# Patient Record
Sex: MALE | Race: WHITE | Employment: OTHER | URBAN - METROPOLITAN AREA
[De-identification: names, ages, dates, MRNs, and addresses within clinical notes are randomized per-mention and may not be internally consistent; named-entity substitution may affect disease eponyms.]

---

## 2019-07-19 ENCOUNTER — HOSPITAL ENCOUNTER (OUTPATIENT)
Dept: SURGERY | Age: 79
Discharge: HOME | End: 2019-07-19

## 2019-07-19 VITALS
TEMPERATURE: 98 F | RESPIRATION RATE: 16 BRPM | BODY MASS INDEX: 33.92 KG/M2 | SYSTOLIC BLOOD PRESSURE: 142 MMHG | HEART RATE: 54 BPM | WEIGHT: 250.44 LBS | DIASTOLIC BLOOD PRESSURE: 84 MMHG | OXYGEN SATURATION: 95 % | HEIGHT: 72 IN

## 2019-07-19 NOTE — PERIOP NOTES
Patient verified name and . Order for consent was not found in EHR and  patient verifies procedure. Type lB surgery, walk in assessment complete. Orders were niot received. Labs per surgeon: none at present time  Labs per anesthesia protocol: none per protocol  EKG is not required per protocol. Patient denies hx of CAD,chest pain or shortness of breath on exertion      Patient answered medical/surgical history questions at their best of ability. All prior to admission medications documented in [facility]. Patient instructed to take the following medications the day of surgery according to anesthesia guidelines with a small sip of water: none . Hold all vitamins, pred ared, aleve 7 days prior to surgery and NSAIDS 5 days prior to surgery. Prescription meds to hold:none    Patient instructed on the following:  Arrive at [facility], time of arrival to be called the day before by 1700  NPO after midnight including gum, mints, and ice chips  Responsible adult must drive patient to the hospital, stay during surgery, and patient will need supervision 24 hours after anesthesia  Use antibacterial soap and hibiclens in shower the night before surgery and on the morning of surgery  All piercings must be removed prior to arrival.    Leave all valuables (money and jewelry) at home but bring insurance card and ID on       DOS. Do not wear make-up, nail polish, lotions, cologne, perfumes, powders, or oil on skin. Patient teach back successful and patient demonstrates knowledge of instruction.

## 2019-08-18 NOTE — H&P
Darek Stevenson   History and physical     Subjective  Problem List:.     1. Right knee pain./DJD. 2. Right shoulder pain/DJD. This patient presents today for evaluation of right shoulder pain. The patient comes in today for evaluation, history and physical, and surgical consent signing. The surgical procedure was reviewed in detail with the patient. The risks, including but not limited to anesthesia, infection, deep vein thrombosis, pulmonary embolus, injury to vessels, tendons, and nerves, paralysis, stroke, heart attack, loss of limb, and death were discussed. The patient understands the post operative course and all questions were answered. No guarantees are made and all alternatives are given. The patient wishes to proceed with the surgery. Appropriate literature and relevant material was reviewed with the patient. Surgical procedure: right shoulder arthroscopy with ketan procedure with possible rotator cuff repair. Allergies:  NKDA    Family health history: heart disease-father. Major events: removal of the lacerated fragment of the liver, right knee arthroscopy. Ongoing medical problems: arthritis, obesity. Social history: Patient is a former tobacco user and EtOH use occasionally. The patient is . Radha Vazquez REVIEW OF SYSTEMS:.     General: Denies weight change, generally healthy,  change in strength or exercise tolerance. .     Head: Denies headaches,  vertigo,  injury. .     Eyes: Denies changes in vision. Ears:  Denies change in hearing. Nose: Denies epistaxis,  obstruction,  discharge. .     Mouth: Denies dental difficulties. Neck: Denies pain or stiffness. Chest: Denies cough or SOB. Heart: Denies chest pains,  palpitations. Abdomen: Denies change in appetite  constipation or diarrhea. : Denies urinary urgency,  dysuria, change in nature of urine. .     Neurologic: Denies weakness, seizures or paralysis.      Psychiatric: Denies depressive symptoms, changes in sleep habits,  changes in thought content. .     Objective  Vital signs: Height 72 inches; Weight 248 lbs; /74 mmHg; Temp 97.3 F; Pulse 64 bpm; Oxygen Saturation 96 %. .    Patient is a [de-identified]year old male who appears his given age and is in no apparent distress. Oriented to person, place, and time. Mood and affect are appropriate for age and situation. Assessment of respiratory effort reveals even and nonlabored respirations. GEN:NAD    Lungs clear to auscultation bilaterally. Heart rate regular without murmur heard to auscultation. The patient exhibits mild antalgic reciprocal gait, and is able to get onto and off of the examination table. Right Shoulder MRI (performed on 8/24/19 @ [facility]) Impression:. Full-thickness supraspinatus and infraspinatus tendon tears. Partial-thickness articular-sided subscapularis tendon tear. AC joint arthropathy. Glenohumeral joint effusion with mild DJD. Avulsion of the long head biceps tendon from the superior glenoid tubercle. Degrees of atrophy of the supraspinatus, infraspinatus, and subscapularis muscle bellies. Mild edema within the infraspinatus muscle. .      Right Shoulder X-Rays (3 views - CPT 10708) taken 5/22/19 revealed; Type 1 acromion, Moderate DJD of the [facility] joint, good joint space in the Glenohumeral joint and subacromial space. . No acute osseous abnormality. Right Shoulder Examination:. Inspection reveals benign arthroscopic portals scars with no s/s infection. Palpation reveals tenderness to  anterolateral acromion. Shoulder forward flexion (active): 160 with pain. Shoulder abduction (active): 160 degrees, with pain. Muscle strength 5/5. Muscle tone is normal.     Stability: No objective shoulder instability. Vascular: Peripheral pulses normal 2/2 upper extremities. Neurologic: Sensation is intact and symmetrical in all dermatomes upper extremities.      Upper extremity deep tendon reflexes are normal..     Coordination is good. Assessment    DIAGNOSIS:        Pain in right shoulder [ICD-10: M25.511], [ICD-9: 719.41], [SNOMED: 75268111975752969]        Primary osteoarthritis, right shoulder [ICD-10: M19.011], [ICD-9: 715.11], [SNOMED: 997652387]        Injury of tendon of long head of biceps of right arm, subsequent encounter [ICD-10: S46.101D], [ICD-9: V58.89], [SNOMED: 297461293]        Tear of supraspinatus tendon [ICD-10: M75.111], [ICD-9: 726.10], [SNOMED: 824830722]        Effusion, right shoulder [ICD-10: M25.411], [ICD-9: 719.01], [SNOMED: 87959013]        Pre-op evaluation [ICD-10: S99.787], [SNOMED: 813116740]  Plan  We discussed the pathophysiology of the diagnosis and options for treatment. We reviewed the conservative treatment options in detail. We discussed the surgical option(s) (right shoulder arthroscopy with ketan procedure with possible rotator cuff repair). We have talked about the complications of surgery, including the possibility of damage to nerves, arteries, vessels and tendons, bleeding, infection, the possibility of sustaining medical problems, even death. We have talked about the possibility that the condition may not improve after surgery  or that it could actually be worse. The patient seems to understand and accept these possible complications. The patient understands and wishes to proceed with surgery at this time. Informed surgical consent obtained. Surgery will be performed at [facility] on 7-23-19. All questions answered at this time. The patient knows to contact the office with any questions or concerns. VERIFICATION OF ANCILLARY DOCUMENTATION: The portions of the chart completed by ancillary personnel were reviewed by the physician. .    RTC: post-op .  TM  L4      MEDICATIONS:        Mobic 7.5 MG Oral Tablet one every 12 hours        Acetaminophen -25 MG Oral Tablet 1 tablet orally daily at bedtime as needed        Aleve -25 MG Oral Tablet as needed        MCT Oil Oral Oil 600 mg daily

## 2019-08-19 ENCOUNTER — ANESTHESIA EVENT (OUTPATIENT)
Dept: SURGERY | Age: 79
End: 2019-08-19
Payer: MEDICARE

## 2019-08-20 ENCOUNTER — ANESTHESIA (OUTPATIENT)
Dept: SURGERY | Age: 79
End: 2019-08-20
Payer: MEDICARE

## 2019-08-20 ENCOUNTER — HOSPITAL ENCOUNTER (OUTPATIENT)
Age: 79
Discharge: HOME | End: 2019-08-20
Attending: ORTHOPAEDIC SURGERY | Admitting: ORTHOPAEDIC SURGERY
Payer: MEDICARE

## 2019-08-20 VITALS
BODY MASS INDEX: 33.91 KG/M2 | DIASTOLIC BLOOD PRESSURE: 74 MMHG | OXYGEN SATURATION: 94 % | SYSTOLIC BLOOD PRESSURE: 154 MMHG | RESPIRATION RATE: 15 BRPM | HEART RATE: 57 BPM | WEIGHT: 250 LBS | TEMPERATURE: 97.7 F

## 2019-08-20 PROBLEM — M75.121 COMPLETE ROTATOR CUFF TEAR OR RUPTURE OF RIGHT SHOULDER, NOT SPECIFIED AS TRAUMATIC: Status: ACTIVE | Noted: 2019-08-20

## 2019-08-20 PROCEDURE — 74011250636 HC RX REV CODE- 250/636: Performed by: ORTHOPAEDIC SURGERY

## 2019-08-20 PROCEDURE — 77030039266 HC ADH SKN EXOFIN S2SG -A: Performed by: ORTHOPAEDIC SURGERY

## 2019-08-20 PROCEDURE — 77030006788 HC BLD SAW OSC STRY -B: Performed by: ORTHOPAEDIC SURGERY

## 2019-08-20 PROCEDURE — 77030027384 HC PRB ARTHSCP SERFAS STRY -C: Performed by: ORTHOPAEDIC SURGERY

## 2019-08-20 PROCEDURE — 76010010054 HC POST OP PAIN BLOCK: Performed by: ORTHOPAEDIC SURGERY

## 2019-08-20 PROCEDURE — 77030002916 HC SUT ETHLN J&J -A: Performed by: ORTHOPAEDIC SURGERY

## 2019-08-20 PROCEDURE — 77030008462 HC STPLR SKN PROX J&J -A: Performed by: ORTHOPAEDIC SURGERY

## 2019-08-20 PROCEDURE — 77030002933 HC SUT MCRYL J&J -A: Performed by: ORTHOPAEDIC SURGERY

## 2019-08-20 PROCEDURE — 77030019605: Performed by: ORTHOPAEDIC SURGERY

## 2019-08-20 PROCEDURE — 77030003602 HC NDL NRV BLK BBMI -B: Performed by: ANESTHESIOLOGY

## 2019-08-20 PROCEDURE — 76210000021 HC REC RM PH II 0.5 TO 1 HR: Performed by: ORTHOPAEDIC SURGERY

## 2019-08-20 PROCEDURE — 74011250636 HC RX REV CODE- 250/636: Performed by: ANESTHESIOLOGY

## 2019-08-20 PROCEDURE — 76210000063 HC OR PH I REC FIRST 0.5 HR: Performed by: ORTHOPAEDIC SURGERY

## 2019-08-20 PROCEDURE — 76942 ECHO GUIDE FOR BIOPSY: CPT | Performed by: ORTHOPAEDIC SURGERY

## 2019-08-20 PROCEDURE — 77030006891 HC BLD SHV RESECT STRY -B: Performed by: ORTHOPAEDIC SURGERY

## 2019-08-20 PROCEDURE — 74011000250 HC RX REV CODE- 250

## 2019-08-20 PROCEDURE — 77030010509 HC AIRWY LMA MSK TELE -A: Performed by: ANESTHESIOLOGY

## 2019-08-20 PROCEDURE — 77030008467 HC STPLR SKN COVD -B: Performed by: ORTHOPAEDIC SURGERY

## 2019-08-20 PROCEDURE — 77030018836 HC SOL IRR NACL ICUM -A: Performed by: ORTHOPAEDIC SURGERY

## 2019-08-20 PROCEDURE — 77030002982 HC SUT POLYSRB J&J -A: Performed by: ORTHOPAEDIC SURGERY

## 2019-08-20 PROCEDURE — A4565 SLINGS: HCPCS | Performed by: ORTHOPAEDIC SURGERY

## 2019-08-20 PROCEDURE — 77030031139 HC SUT VCRL2 J&J -A: Performed by: ORTHOPAEDIC SURGERY

## 2019-08-20 PROCEDURE — 74011250636 HC RX REV CODE- 250/636

## 2019-08-20 PROCEDURE — 76010000162 HC OR TIME 1.5 TO 2 HR INTENSV-TIER 1: Performed by: ORTHOPAEDIC SURGERY

## 2019-08-20 PROCEDURE — 77030003666 HC NDL SPINAL BD -A: Performed by: ORTHOPAEDIC SURGERY

## 2019-08-20 PROCEDURE — 77030022036 HC BLD SHV TOMCAT STRY -B: Performed by: ORTHOPAEDIC SURGERY

## 2019-08-20 PROCEDURE — 77030040361 HC SLV COMPR DVT MDII -B: Performed by: ORTHOPAEDIC SURGERY

## 2019-08-20 PROCEDURE — 76060000034 HC ANESTHESIA 1.5 TO 2 HR: Performed by: ORTHOPAEDIC SURGERY

## 2019-08-20 PROCEDURE — 77030004453 HC BUR SHV STRY -B: Performed by: ORTHOPAEDIC SURGERY

## 2019-08-20 RX ORDER — SODIUM CHLORIDE 0.9 % (FLUSH) 0.9 %
5-40 SYRINGE (ML) INJECTION AS NEEDED
Status: DISCONTINUED | OUTPATIENT
Start: 2019-08-20 | End: 2019-08-20 | Stop reason: HOSPADM

## 2019-08-20 RX ORDER — ROPIVACAINE HYDROCHLORIDE 5 MG/ML
INJECTION, SOLUTION EPIDURAL; INFILTRATION; PERINEURAL
Status: COMPLETED | OUTPATIENT
Start: 2019-08-20 | End: 2019-08-20

## 2019-08-20 RX ORDER — EPINEPHRINE 1 MG/ML
INJECTION INTRAMUSCULAR; INTRAVENOUS; SUBCUTANEOUS AS NEEDED
Status: DISCONTINUED | OUTPATIENT
Start: 2019-08-20 | End: 2019-08-20 | Stop reason: HOSPADM

## 2019-08-20 RX ORDER — FENTANYL CITRATE 50 UG/ML
100 INJECTION, SOLUTION INTRAMUSCULAR; INTRAVENOUS ONCE
Status: COMPLETED | OUTPATIENT
Start: 2019-08-20 | End: 2019-08-20

## 2019-08-20 RX ORDER — DEXAMETHASONE SODIUM PHOSPHATE 4 MG/ML
INJECTION, SOLUTION INTRA-ARTICULAR; INTRALESIONAL; INTRAMUSCULAR; INTRAVENOUS; SOFT TISSUE AS NEEDED
Status: DISCONTINUED | OUTPATIENT
Start: 2019-08-20 | End: 2019-08-20 | Stop reason: HOSPADM

## 2019-08-20 RX ORDER — PROPOFOL 10 MG/ML
INJECTION, EMULSION INTRAVENOUS AS NEEDED
Status: DISCONTINUED | OUTPATIENT
Start: 2019-08-20 | End: 2019-08-20 | Stop reason: HOSPADM

## 2019-08-20 RX ORDER — EPHEDRINE SULFATE 50 MG/ML
INJECTION, SOLUTION INTRAVENOUS AS NEEDED
Status: DISCONTINUED | OUTPATIENT
Start: 2019-08-20 | End: 2019-08-20 | Stop reason: HOSPADM

## 2019-08-20 RX ORDER — ONDANSETRON 2 MG/ML
INJECTION INTRAMUSCULAR; INTRAVENOUS AS NEEDED
Status: DISCONTINUED | OUTPATIENT
Start: 2019-08-20 | End: 2019-08-20 | Stop reason: HOSPADM

## 2019-08-20 RX ORDER — SODIUM CHLORIDE, SODIUM LACTATE, POTASSIUM CHLORIDE, CALCIUM CHLORIDE 600; 310; 30; 20 MG/100ML; MG/100ML; MG/100ML; MG/100ML
75 INJECTION, SOLUTION INTRAVENOUS CONTINUOUS
Status: DISCONTINUED | OUTPATIENT
Start: 2019-08-20 | End: 2019-08-20 | Stop reason: HOSPADM

## 2019-08-20 RX ORDER — SODIUM CHLORIDE 0.9 % (FLUSH) 0.9 %
5-40 SYRINGE (ML) INJECTION EVERY 8 HOURS
Status: DISCONTINUED | OUTPATIENT
Start: 2019-08-20 | End: 2019-08-20 | Stop reason: HOSPADM

## 2019-08-20 RX ORDER — MIDAZOLAM HYDROCHLORIDE 1 MG/ML
2 INJECTION, SOLUTION INTRAMUSCULAR; INTRAVENOUS ONCE
Status: COMPLETED | OUTPATIENT
Start: 2019-08-20 | End: 2019-08-20

## 2019-08-20 RX ORDER — OXYCODONE HYDROCHLORIDE 5 MG/1
5 TABLET ORAL
Status: DISCONTINUED | OUTPATIENT
Start: 2019-08-20 | End: 2019-08-20 | Stop reason: HOSPADM

## 2019-08-20 RX ORDER — CEFAZOLIN SODIUM/WATER 2 G/20 ML
2 SYRINGE (ML) INTRAVENOUS ONCE
Status: COMPLETED | OUTPATIENT
Start: 2019-08-20 | End: 2019-08-20

## 2019-08-20 RX ORDER — OXYCODONE AND ACETAMINOPHEN 10; 325 MG/1; MG/1
1 TABLET ORAL AS NEEDED
Status: DISCONTINUED | OUTPATIENT
Start: 2019-08-20 | End: 2019-08-20 | Stop reason: HOSPADM

## 2019-08-20 RX ORDER — LIDOCAINE HYDROCHLORIDE 20 MG/ML
INJECTION, SOLUTION EPIDURAL; INFILTRATION; INTRACAUDAL; PERINEURAL AS NEEDED
Status: DISCONTINUED | OUTPATIENT
Start: 2019-08-20 | End: 2019-08-20 | Stop reason: HOSPADM

## 2019-08-20 RX ORDER — HYDROMORPHONE HYDROCHLORIDE 2 MG/ML
0.5 INJECTION, SOLUTION INTRAMUSCULAR; INTRAVENOUS; SUBCUTANEOUS
Status: DISCONTINUED | OUTPATIENT
Start: 2019-08-20 | End: 2019-08-20 | Stop reason: HOSPADM

## 2019-08-20 RX ADMIN — LIDOCAINE HYDROCHLORIDE 100 MG: 20 INJECTION, SOLUTION EPIDURAL; INFILTRATION; INTRACAUDAL; PERINEURAL at 08:50

## 2019-08-20 RX ADMIN — ONDANSETRON 4 MG: 2 INJECTION INTRAMUSCULAR; INTRAVENOUS at 09:30

## 2019-08-20 RX ADMIN — EPHEDRINE SULFATE 10 MG: 50 INJECTION, SOLUTION INTRAVENOUS at 09:31

## 2019-08-20 RX ADMIN — Medication 2 G: at 08:56

## 2019-08-20 RX ADMIN — FENTANYL CITRATE 100 MCG: 50 INJECTION INTRAMUSCULAR; INTRAVENOUS at 07:49

## 2019-08-20 RX ADMIN — PROPOFOL 100 MG: 10 INJECTION, EMULSION INTRAVENOUS at 08:50

## 2019-08-20 RX ADMIN — EPHEDRINE SULFATE 10 MG: 50 INJECTION, SOLUTION INTRAVENOUS at 09:46

## 2019-08-20 RX ADMIN — ROPIVACAINE HYDROCHLORIDE 20 ML: 5 INJECTION, SOLUTION EPIDURAL; INFILTRATION; PERINEURAL at 07:53

## 2019-08-20 RX ADMIN — DEXAMETHASONE SODIUM PHOSPHATE 4 MG: 4 INJECTION, SOLUTION INTRA-ARTICULAR; INTRALESIONAL; INTRAMUSCULAR; INTRAVENOUS; SOFT TISSUE at 09:30

## 2019-08-20 RX ADMIN — SODIUM CHLORIDE, SODIUM LACTATE, POTASSIUM CHLORIDE, AND CALCIUM CHLORIDE 75 ML/HR: 600; 310; 30; 20 INJECTION, SOLUTION INTRAVENOUS at 07:05

## 2019-08-20 RX ADMIN — EPHEDRINE SULFATE 10 MG: 50 INJECTION, SOLUTION INTRAVENOUS at 09:00

## 2019-08-20 RX ADMIN — EPHEDRINE SULFATE 10 MG: 50 INJECTION, SOLUTION INTRAVENOUS at 09:20

## 2019-08-20 RX ADMIN — MIDAZOLAM HYDROCHLORIDE 1 MG: 2 INJECTION, SOLUTION INTRAMUSCULAR; INTRAVENOUS at 07:49

## 2019-08-20 NOTE — DISCHARGE INSTRUCTIONS
May remove sling and begin ROM when block wears off. Ice to shoulder tonight and prn. RX for pain meds on chart. Call [phone] with questions or problems. TYPICAL SIDE EFFECTS OF PAIN MEDICATION:  *    Constipation: Drink lots of fluids. Over the counter stool softener if needed. *    Nausea: Take pain medication with food. Call your doctor with persistent nausea. ACTIVITY  · As tolerated and as directed by your doctor. · Bathe or shower as directed by your doctor. DIET  · Day of surgery: Clear liquids until no nausea or vomiting; small portion, light diet [place] foods (ex: baked chicken, plain rice, grits, scrambled eggs, toast). Nothing greasy, fried or spicy today. · Advance to regular diet on second day, unless your doctor orders otherwise. · If nausea and vomiting continues, call your doctor. PAIN  · Take pain medication as directed by your doctor. · DO NOT take aspirin or blood thinners unless directed by your doctor. CALL YOUR DOCTOR IF    s Call your doctor if pain is NOT relieved by medication.   s Excessive bleeding that does not stop after holding pressure over the area  · Temperature of 101 degrees F or above  · Excessive redness, swelling or bruising, and/ or green or yellow, smelly discharge from incision    AFTER ANESTHESIA   · For the first 24 hours: DO NOT Drive, Drink alcoholic beverages, or Make important decisions. · Be aware of dizziness following anesthesia and while taking pain medication. DISCHARGE SUMMARY from Nurse    PATIENT INSTRUCTIONS:    After general anesthesia or intravenous sedation, for 24 hours or while taking prescription Narcotics:  · Limit your activities  · Do not drive and operate hazardous machinery  · Do not make important personal or business decisions  · Do  not drink alcoholic beverages  · If you have not urinated within 8 hours after discharge, please contact your surgeon on call.     *  Please give a list of your current medications to your Primary Care Provider. *  Please update this list whenever your medications are discontinued, doses are      changed, or new medications (including over-the-counter products) are added. *  Please carry medication information at all times in case of emergency situations. Preventing Infection at Home  We care about preventing infection and avoiding the spread of germs - not only when you are in the hospital but also when you return home. When you return home from the hospital, its important to take the following steps to help prevent infection and avoid spreading germs that could infect you and others. Ask everyone in your home to follow these guidelines, too. Clean Your Hands  · Clean your hands whenever your hands are visibly dirty, before you eat, before or after touching your mouth, nose or eyes, and before preparing food. Clean them after contact with body fluids, using the restroom, touching animals or changing diapers. · When washing hands, wet them with warm water and work up a lather. Rub hands for at least 15 seconds, then rinse them and pat them dry with a clean towel or paper towel. · When using hand sanitizers, it should take about 15 seconds to rub your hands dry. If not, you probably didnt apply enough . Cover Your Sneeze or Cough  Germs are released into the air whenever you sneeze or cough. To prevent the spread of infection:  · Turn away from other people before coughing or sneezing. · Cover your mouth or nose with a tissue when you cough or sneeze. Put the tissue in the trash. · If you dont have a tissue, cough or sneeze into your upper sleeve, not your hands. · Always clean your hands after coughing or sneezing. Care for Wounds  Your skin is your bodys first line of defense against germs, but an open wound leaves an easy way for germs to enter your body.  To prevent infection:  · Clean your hands before and after changing wound dressings, and wear gloves to change dressings if recommended by your doctor. · Take special care with IV lines or other devices inserted into the body. If you must touch them, clean your hands first.  · Follow any specific instructions from your doctor to care for your wounds. Contact your doctor if you experience any signs of infection, such as fever or increased redness at the surgical or wound site. Keep a Clean Home  · Clean or wipe commonly touched hard surfaces like door handles, sinks, tabletops, phones and TV remotes. · Use products labeled disinfectant to kill harmful bacteria and viruses. · Use a clean cloth or paper towel to clean and dry surfaces. Wiping surfaces with a dirty dishcloth, sponge or towel will only spread germs. · Never share toothbrushes, bergman, drinking glasses, utensils, razor blades, face cloths or bath towels to avoid spreading germs. · Be sure that the linens that you sleep on are clean. · Keep pets away from wounds and wash your hands after touching pets, their toys or bedding. We care about you and your health. Remember, preventing infections is a team effort between you, your family, friends and health care providers. These are general instructions for a healthy lifestyle:    No smoking/ No tobacco products/ Avoid exposure to second hand smoke    Surgeon General's Warning:  Quitting smoking now greatly reduces serious risk to your health. Obesity, smoking, and sedentary lifestyle greatly increases your risk for illness    A healthy diet, regular physical exercise & weight monitoring are important for maintaining a healthy lifestyle    You may be retaining fluid if you have a history of heart failure or if you experience any of the following symptoms:  Weight gain of 3 pounds or more overnight or 5 pounds in a week, increased swelling in our hands or feet or shortness of breath while lying flat in bed.   Please call your doctor as soon as you notice any of these symptoms; do not wait until your next office visit. Recognize signs and symptoms of STROKE:    F-face looks uneven    A-arms unable to move or move unevenly    S-speech slurred or non-existent    T-time-call 911 as soon as signs and symptoms begin-DO NOT go       Back to bed or wait to see if you get better-TIME IS BRAIN.

## 2019-08-20 NOTE — ANESTHESIA PROCEDURE NOTES
Peripheral Block    Start time: 8/20/2019 7:49 AM  End time: 8/20/2019 7:53 AM  Performed by: Dain Ga MD  Authorized by: Dain Ga MD       Pre-procedure: Indications: at surgeon's request and post-op pain management    Preanesthetic Checklist: patient identified, risks and benefits discussed, site marked, timeout performed, anesthesia consent given and patient being monitored    Timeout Time: 07:49          Block Type:   Block Type:   Interscalene  Laterality:  Right  Monitoring:  Standard ASA monitoring, continuous pulse ox, frequent vital sign checks, heart rate, oxygen and responsive to questions  Injection Technique:  Single shot  Procedures: ultrasound guided and nerve stimulator    Prep: chlorhexidine    Location:  Interscalene  Needle Type:  Stimuplex  Needle Gauge:  20 G  Needle Localization:  Anatomical landmarks, nerve stimulator and ultrasound guidance    Assessment:  Number of attempts:  1  Injection Assessment:  Incremental injection every 5 mL, local visualized surrounding nerve on ultrasound, negative aspiration for blood, no intravascular symptoms, no paresthesia and ultrasound image on chart  Patient tolerance:  Patient tolerated the procedure well with no immediate complications

## 2019-08-20 NOTE — ANESTHESIA PREPROCEDURE EVALUATION
Relevant Problems   No relevant active problems       Anesthetic History   No history of anesthetic complications            Review of Systems / Medical History  Patient summary reviewed and pertinent labs reviewed    Pulmonary  Within defined limits                 Neuro/Psych   Within defined limits           Cardiovascular                  Exercise tolerance: >4 METS     GI/Hepatic/Renal  Within defined limits              Endo/Other        Obesity     Other Findings              Physical Exam    Airway  Mallampati: II  TM Distance: > 6 cm  Neck ROM: normal range of motion   Mouth opening: Normal     Cardiovascular    Rhythm: regular           Dental    Dentition: Full upper dentures and Lower partial plate     Pulmonary                 Abdominal  GI exam deferred       Other Findings            Anesthetic Plan    ASA: 2  Anesthesia type: general - interscalene block      Post-op pain plan if not by surgeon: peripheral nerve block single    Induction: Intravenous  Anesthetic plan and risks discussed with: Patient

## 2019-08-20 NOTE — OP NOTES
[facility]  OPERATIVE REPORT    Name:  Ghanshyam Caraballo  MR#:  [id number]  :  1940  ACCOUNT #:  [de-identified]  DATE OF SERVICE:  2019    PREOPERATIVE DIAGNOSES:  Right shoulder rotator cuff tear, acromioclavicular joint degenerative joint disease, impingement, labral tear. POSTOPERATIVE DIAGNOSES:  Partial rotator cuff tear, right; acromioclavicular joint degenerative joint disease, impingement, and labral tear. PROCEDURE PERFORMED:  Right shoulder arthroscopy with arthroscopic Camryn, subacromial depression, rotator cuff debridement, and labral debridement. SURGEON:  Ellsworth Lanes, MD, and Dr. Ashley Cruz    ASSISTANT:  Ashley Cruz    ANESTHESIA:  general    COMPLICATIONS:  None noted    SPECIMENS REMOVED:  none        ESTIMATED BLOOD LOSS:  minimal    INDICATION:  The patient is an 68-year-old gentleman, well known, with long history of progressive and disabling right shoulder pain. Subsequent MRI scanning showed labral tearing, AC joint DJD, subacromial impingement, as well as a supraspinatus tear. He has opted for surgical treatment and we did discuss with the patient he is [de-identified]years old, he is past the optimal age for repair although he is in good health and we discussed this and he wished to proceed understanding that there are significantly less chance of success at his age. Informed consent was obtained. PROCEDURE IN DETAIL:  The patient had a preoperative block placed. He was seen in the preoperative area. His shoulder was marked. He was taken to the operative room number 7. Successful general anesthetic was achieved. He was placed in the beach chair reclining position with all prominences well padded. The right shoulder was prepped and draped into a sterile field. He received Ancef preoperatively.   A time-out was effected by the surgeon and was confirmed by the operative team.  We utilized the posterior portal two fingerbreadths medial and inferior to the posterolateral corner of the acromion. An anterior portal was created with the [employer] switching zafar. The shoulder joint was inspected in a systematic fashion. There appeared to be partial tearing of the rotator cuff off the supraspinatus insertion but it was intact. The undersurface appeared to be intact and cannot see up in the subacromial space. There was significant labral tearing from the 1 o'clock to 11 o'clock position and there was no evidence of any biceps tendon as he had a previous biceps rupture. This was debrided down to a stable base. There was some moderate degenerative changes in the anterior humeral head. We then went to the over-the-top position creating an anterolateral portal.  We did a thorough subacromial decompression. We isolated out the [facility] joint. We performed an arthroscopic Camryn procedure with a blade and did a substantial subacromial decompression removing all bursal tissue and performing an acromioplasty as well. We actually extended our anterolateral portal big enough to get one fingerbreadth and I could not palpate a full-thickness tear on the entire length so we felt it was best not to change this. We thoroughly irrigated the wounds, closed the sutures with Dermalon our portal sites, and sterile bulky dressing was applied and a sling. He has postoperative pain medication, prescription on the chart. Instruction:  He has a followup appointment scheduled in 10 days. He is instructed to call our office sooner if there are any issues or questions.       MD MELISSA Parish/[email or web address]/  D:  08/20/2019 10:19  T:  08/20/2019 10:40  JOB #:  [id number]

## 2019-08-20 NOTE — ANESTHESIA POSTPROCEDURE EVALUATION
Procedure(s):  RIGHT SHOULDER ARTHROSCOPY WITH LIA  ROTATOR CUFF Debridment/  labral debridement. Steph Brian general    Anesthesia Post Evaluation      Multimodal analgesia: multimodal analgesia used between 6 hours prior to anesthesia start to PACU discharge  Patient location during evaluation: PACU  Patient participation: complete - patient participated  Level of consciousness: awake  Pain management: adequate  Airway patency: patent  Anesthetic complications: no  Cardiovascular status: acceptable  Respiratory status: acceptable  Hydration status: acceptable  Post anesthesia nausea and vomiting:  none      Vitals Value Taken Time   /80 8/20/2019 10:36 AM   Temp 36.4 °C (97.5 °F) 8/20/2019 10:22 AM   Pulse 57 8/20/2019 10:38 AM   Resp 16 8/20/2019 10:22 AM   SpO2 94 % 8/20/2019 10:38 AM   Vitals shown include unvalidated device data.

## 2019-08-20 NOTE — BRIEF OP NOTE
BRIEF OPERATIVE NOTE    Date of Procedure: 8/20/2019   Preoperative Diagnosis: RCT, AC joint DJD, impingement right shoulder,labral tear  Postoperative Diagnosis: partial RCT, AC joint DJD, impingement, labral tear right  Procedure(s):  RIGHT SHOULDER ARTHROSCOPY WITH LIA  ROTATOR CUFF Debridment, labral debridement.   Surgeon(s) and Role:     * Pierre Stanton MD - Primary     Elvia Reed MD - Assisting             Surgical Staff:  Circ-1: Eddie Metzger RN  Circ-2: Mindi Cabrera RN  Scrub Tech-1: Alejandra Farmer  Event Time In Time Out   Incision Start 4372    Incision Close 0957      Anesthesia: General   Estimated Blood Loss: minimal  Specimens: * No specimens in log *   Findings:  A above   Complications:  None noted  Implants: * No implants in log *

## 2019-08-20 NOTE — PERIOP NOTES
PACU DISCHARGE NOTE  Vital signs stable, pain well controlled, alert and oriented times three or at baseline, no anesthetic complications. IV removed with catheter tip intact. Patient dressed with sling under shirt per patient preference. Written and verbal discharge instructions given, including pain control, dressing care and follow up appointment. Roque Dawson,  verbalized understanding and signed discharge instructions electronically. All questions answered prior to discharge. Dr Mayra Silver okay to discharge at this time. Pt and all belongings taken via wheelchair and safely put in vehicle.

## 2019-08-20 NOTE — H&P
History and Physical Updated with no interval change. Quang Leal MD History and Physical Updated with no interval change.  Quang Leal MD

## 2022-03-19 PROBLEM — M75.121 COMPLETE ROTATOR CUFF TEAR OR RUPTURE OF RIGHT SHOULDER, NOT SPECIFIED AS TRAUMATIC: Status: ACTIVE | Noted: 2019-08-20

## 2024-10-30 LAB
PSA SERPL-MCNC: 1.1 NG/ML (ref 0–4)
REFLEX CRITERIA: NORMAL

## (undated) DEVICE — SLIM BODY SKIN STAPLER: Brand: APPOSE ULC

## (undated) DEVICE — GARMENT,MEDLINE,DVT,INT,CALF,MED, GEN2: Brand: MEDLINE

## (undated) DEVICE — SYR 50ML LR LCK 1ML GRAD NSAF --

## (undated) DEVICE — KIT CHAIR TRIMANO FOAM W/ SUPP ARM DRP ERGONOMICALLY DESIGNED

## (undated) DEVICE — ABDOMINAL PAD: Brand: DERMACEA

## (undated) DEVICE — BANDAGE COMPR SELF ADH 5 YDX6 IN TAN STRL PREMIERPRO LF

## (undated) DEVICE — SOLUTION IRRIG 3000ML 0.9% SOD CHL FLX CONT 0797208] ICU MEDICAL INC]

## (undated) DEVICE — DRAPE,SHOULDER,BEACH CHAIR,STERILE: Brand: MEDLINE

## (undated) DEVICE — DRAPE,U/SHT,SPLIT,FILM,60X84,STERILE: Brand: MEDLINE

## (undated) DEVICE — SUTURE VCRL SZ 2-0 L27IN ABSRB UD L26MM CT-2 1/2 CIR J269H

## (undated) DEVICE — PRECISION THIN (9.0 X 0.38 X 31.0MM)

## (undated) DEVICE — NEEDLE HYPO 18GA L1.5IN PNK S STL HUB POLYPR SHLD REG BVL

## (undated) DEVICE — SUTURE MCRYL SZ 4-0 L27IN ABSRB UD L19MM PS-2 1/2 CIR PRIM Y426H

## (undated) DEVICE — SET IRRIG W 96IN TBNG 4 LN FLX BG

## (undated) DEVICE — AMD ANTIMICROBIAL GAUZE SPONGES,12 PLY USP TYPE VII, 0.2% POLYHEXAMETHYLENE BIGUANIDE HCI (PHMB): Brand: CURITY

## (undated) DEVICE — 90-S ACCELERATOR, SUCTION PROBE, NON-BENDABLE, MAX CUT LEVEL 11: Brand: SERFAS ENERGY

## (undated) DEVICE — SUT ETHLN 3-0 18IN PS1 BLK --

## (undated) DEVICE — SET IRRIG DST FLX M CONN

## (undated) DEVICE — [AGGRESSIVE 6-FLUTE BARREL BUR, ARTHROSCOPIC SHAVER BLADE,  DO NOT RESTERILIZE,  DO NOT USE IF PACKAGE IS DAMAGED,  KEEP DRY,  KEEP AWAY FROM SUNLIGHT]: Brand: FORMULA

## (undated) DEVICE — SLING ARM 2-42INX9-23IN PCH -- XLG

## (undated) DEVICE — BLADE SHV 4.0MM TOMCAT --

## (undated) DEVICE — STOCKINETTE,IMPERVIOUS,12X48,STERILE: Brand: MEDLINE

## (undated) DEVICE — 3M™ COBAN™ SELF-ADHERENT WRAP, 1586S, STERILE, 6 IN X 5 YD (15 CM X 4,5 M), 12 ROLLS/CASE: Brand: 3M™ COBAN™

## (undated) DEVICE — APPLICATOR BNDG 1MM ADH PREMIERPRO EXOFIN

## (undated) DEVICE — INTENDED FOR TISSUE SEPARATION, AND OTHER PROCEDURES THAT REQUIRE A SHARP SURGICAL BLADE TO PUNCTURE OR CUT.: Brand: BARD-PARKER SAFETY BLADES SIZE 15, STERILE

## (undated) DEVICE — NDL SPNE QNCKE 18GX3.5IN LF --

## (undated) DEVICE — SUTURE VCRL SZ 0 L36IN ABSRB UD L36MM CT-1 1/2 CIR J946H

## (undated) DEVICE — (D)PREP SKN CHLRAPRP APPL 26ML -- CONVERT TO ITEM 371833

## (undated) DEVICE — SOFT SILICONE HYDROCELLULAR FOAM DRESSING WITH LOCK AWAY LAYER: Brand: ALLEVYN LIFE XL 21X21 CTN10

## (undated) DEVICE — SURGICAL PROCEDURE PACK BASIC ST FRANCIS

## (undated) DEVICE — REM POLYHESIVE ADULT PATIENT RETURN ELECTRODE: Brand: VALLEYLAB

## (undated) DEVICE — Device

## (undated) DEVICE — 3M™ STERI-STRIP™ BLEND TONE SKIN CLOSURES, B1551, TAN, 1/4 IN X 3 IN (6 MM X 75 MM), 3 STRIPS/ENVELOPE: Brand: 3M™ STERI-STRIP™

## (undated) DEVICE — RESTRAINT UNIVERSAL HEAD DISP --

## (undated) DEVICE — CURITY ABDOMINAL PAD: Brand: CURITY

## (undated) DEVICE — [RESECTOR CUTTER, ARTHROSCOPIC SHAVER BLADE,  DO NOT RESTERILIZE,  DO NOT USE IF PACKAGE IS DAMAGED,  KEEP DRY,  KEEP AWAY FROM SUNLIGHT]: Brand: FORMULA